# Patient Record
Sex: FEMALE | Race: WHITE | Employment: STUDENT | URBAN - METROPOLITAN AREA
[De-identification: names, ages, dates, MRNs, and addresses within clinical notes are randomized per-mention and may not be internally consistent; named-entity substitution may affect disease eponyms.]

---
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
## 2018-01-05 ENCOUNTER — OFFICE VISIT (OUTPATIENT)
Dept: FAMILY MEDICINE CLINIC | Facility: CLINIC | Age: 18
End: 2018-01-05

## 2018-01-05 VITALS
OXYGEN SATURATION: 99 % | RESPIRATION RATE: 16 BRPM | TEMPERATURE: 99 F | HEIGHT: 64 IN | SYSTOLIC BLOOD PRESSURE: 116 MMHG | HEART RATE: 78 BPM | BODY MASS INDEX: 19.84 KG/M2 | WEIGHT: 116.19 LBS | DIASTOLIC BLOOD PRESSURE: 72 MMHG

## 2018-01-05 DIAGNOSIS — H18.891 CORNEAL IRRITATION OF RIGHT EYE: Primary | ICD-10-CM

## 2018-01-05 PROCEDURE — 99213 OFFICE O/P EST LOW 20 MIN: CPT | Performed by: NURSE PRACTITIONER

## 2018-01-05 RX ORDER — OFLOXACIN 3 MG/ML
2 SOLUTION/ DROPS OPHTHALMIC 4 TIMES DAILY
Qty: 1 BOTTLE | Refills: 0 | Status: SHIPPED | OUTPATIENT
Start: 2018-01-05 | End: 2018-01-12

## 2018-07-24 ENCOUNTER — OFFICE VISIT (OUTPATIENT)
Dept: OBGYN CLINIC | Facility: CLINIC | Age: 18
End: 2018-07-24
Payer: COMMERCIAL

## 2018-07-24 VITALS
HEART RATE: 68 BPM | HEIGHT: 64 IN | SYSTOLIC BLOOD PRESSURE: 104 MMHG | DIASTOLIC BLOOD PRESSURE: 62 MMHG | WEIGHT: 116 LBS | BODY MASS INDEX: 19.81 KG/M2

## 2018-07-24 DIAGNOSIS — Z30.41 ENCOUNTER FOR SURVEILLANCE OF CONTRACEPTIVE PILLS: ICD-10-CM

## 2018-07-24 DIAGNOSIS — Z01.419 WELL WOMAN EXAM WITH ROUTINE GYNECOLOGICAL EXAM: Primary | ICD-10-CM

## 2018-07-24 PROCEDURE — 99395 PREV VISIT EST AGE 18-39: CPT | Performed by: NURSE PRACTITIONER

## 2018-07-24 RX ORDER — LEVONORGESTREL AND ETHINYL ESTRADIOL 0.1-0.02MG
1 KIT ORAL DAILY
Qty: 84 TABLET | Refills: 4 | Status: SHIPPED | OUTPATIENT
Start: 2018-07-24 | End: 2019-08-21

## 2018-07-24 NOTE — PROGRESS NOTES
Here for Routine Annual Exam  Had 2 yeast infections in the Fall/ Winter while away at [employer]. Menses-  Regular, no concern. Contraception- OCP. No C/O, declines STD screen. ROS: No Cardiac, Respiratory, GI,  or Neurological symptoms.     PE:

## 2019-08-21 ENCOUNTER — OFFICE VISIT (OUTPATIENT)
Dept: OBGYN CLINIC | Facility: CLINIC | Age: 19
End: 2019-08-21
Payer: COMMERCIAL

## 2019-08-21 VITALS
SYSTOLIC BLOOD PRESSURE: 108 MMHG | WEIGHT: 126.81 LBS | HEART RATE: 84 BPM | DIASTOLIC BLOOD PRESSURE: 70 MMHG | BODY MASS INDEX: 22 KG/M2

## 2019-08-21 DIAGNOSIS — Z01.419 WELL WOMAN EXAM WITH ROUTINE GYNECOLOGICAL EXAM: Primary | ICD-10-CM

## 2019-08-21 DIAGNOSIS — Z30.41 ENCOUNTER FOR SURVEILLANCE OF CONTRACEPTIVE PILLS: ICD-10-CM

## 2019-08-21 DIAGNOSIS — Z11.3 SCREEN FOR STD (SEXUALLY TRANSMITTED DISEASE): ICD-10-CM

## 2019-08-21 PROCEDURE — 99395 PREV VISIT EST AGE 18-39: CPT | Performed by: NURSE PRACTITIONER

## 2019-08-21 PROCEDURE — 87591 N.GONORRHOEAE DNA AMP PROB: CPT | Performed by: NURSE PRACTITIONER

## 2019-08-21 PROCEDURE — 87491 CHLMYD TRACH DNA AMP PROBE: CPT | Performed by: NURSE PRACTITIONER

## 2019-08-21 RX ORDER — LEVONORGESTREL AND ETHINYL ESTRADIOL 0.1-0.02MG
1 KIT ORAL DAILY
Qty: 84 TABLET | Refills: 4 | Status: SHIPPED | OUTPATIENT
Start: 2019-08-21 | End: 2020-12-15

## 2019-08-21 NOTE — PROGRESS NOTES
Here for Routine Annual Exam  No concerns or questions. Menses are regular, no concern. Contraception- OCP. No C/O    ROS: No Cardiac, Respiratory, GI,  or Neurological symptoms.     PE:  GENERAL: well developed, well nourished, in no apparent dist

## 2019-08-22 LAB
C TRACH DNA SPEC QL NAA+PROBE: NEGATIVE
N GONORRHOEA DNA SPEC QL NAA+PROBE: NEGATIVE

## 2020-11-17 ENCOUNTER — TELEPHONE (OUTPATIENT)
Dept: OBGYN CLINIC | Facility: CLINIC | Age: 20
End: 2020-11-17

## 2020-11-17 NOTE — TELEPHONE ENCOUNTER
Patient returned call. 23year old patient complaining of pain with intercourse. Has sharp lower abd pain with deep penetration and some low back pain. The low back pain has been ongoing- stiff, tightness for roughly a week.  Did improve with stretching reassured.

## 2020-11-17 NOTE — TELEPHONE ENCOUNTER
Patient called, she is away from home because of school, she has an appt with [facility] for her annual on her holiday break on Dec 15th. She wants to speak to a nurse and need advise if she should see a Dr where she is at in the meantime.  She is having pain duri

## 2020-12-15 ENCOUNTER — OFFICE VISIT (OUTPATIENT)
Dept: OBGYN CLINIC | Facility: CLINIC | Age: 20
End: 2020-12-15
Payer: COMMERCIAL

## 2020-12-15 VITALS
BODY MASS INDEX: 21.62 KG/M2 | DIASTOLIC BLOOD PRESSURE: 74 MMHG | SYSTOLIC BLOOD PRESSURE: 108 MMHG | WEIGHT: 126.63 LBS | HEART RATE: 95 BPM | HEIGHT: 64 IN

## 2020-12-15 DIAGNOSIS — Z12.4 CERVICAL CANCER SCREENING: ICD-10-CM

## 2020-12-15 DIAGNOSIS — Z01.419 WELL WOMAN EXAM WITH ROUTINE GYNECOLOGICAL EXAM: Primary | ICD-10-CM

## 2020-12-15 DIAGNOSIS — Z30.41 ENCOUNTER FOR SURVEILLANCE OF CONTRACEPTIVE PILLS: ICD-10-CM

## 2020-12-15 PROCEDURE — 3074F SYST BP LT 130 MM HG: CPT | Performed by: NURSE PRACTITIONER

## 2020-12-15 PROCEDURE — 87625 HPV TYPES 16 & 18 ONLY: CPT | Performed by: NURSE PRACTITIONER

## 2020-12-15 PROCEDURE — 3078F DIAST BP <80 MM HG: CPT | Performed by: NURSE PRACTITIONER

## 2020-12-15 PROCEDURE — 87624 HPV HI-RISK TYP POOLED RSLT: CPT | Performed by: NURSE PRACTITIONER

## 2020-12-15 PROCEDURE — 3008F BODY MASS INDEX DOCD: CPT | Performed by: NURSE PRACTITIONER

## 2020-12-15 PROCEDURE — 99395 PREV VISIT EST AGE 18-39: CPT | Performed by: NURSE PRACTITIONER

## 2020-12-15 RX ORDER — LEVONORGESTREL AND ETHINYL ESTRADIOL 0.1-0.02MG
1 KIT ORAL DAILY
Qty: 84 TABLET | Refills: 4 | Status: SHIPPED | OUTPATIENT
Start: 2020-12-15 | End: 2022-01-28

## 2020-12-15 NOTE — PROGRESS NOTES
Here for Routine Annual Exam  No concerns or questions. Menses are regular, no concerns. Contraception- OCP and doing well. No C/O- she had an episode of low back pain accompanied by painful intercourse x 1 in November. It resolved on its own.     RO

## 2020-12-28 ENCOUNTER — TELEPHONE (OUTPATIENT)
Dept: OBGYN CLINIC | Facility: CLINIC | Age: 20
End: 2020-12-28

## 2020-12-28 NOTE — TELEPHONE ENCOUNTER
Patient has left two mychart messages regarding her test results and is confused has questions .   Thank you

## 2020-12-28 NOTE — TELEPHONE ENCOUNTER
Spoke with patient and her mother (per patient's request) and explained HPV results to both. She verbalized understanding. No further questions or concerns.

## 2021-06-25 NOTE — PROGRESS NOTES
HPI:   Bhavesh Haas is a 23year old female who presents for a complete physical exam for college. Pt needs to get her titers done. Pt prefers to get the blood test done for TB since her work hours do not allow her to get back hereto get a TB test read. TWICE DAILY WITH FOOD AND WATER. NEVER TAKE ON EMPTY STOMACH. • Levonorgestrel-Ethinyl Estrad (AVIANE) 0.1-20 MG-MCG Oral Tab Take 1 tablet by mouth daily. 84 tablet 4   • Probiotic Product (PROBIOTIC-10 OR) Take by mouth.  (Patient not taking: Reported distress  SKIN: no rashes,no suspicious lesions  HEENT: atraumatic, normocephalic,ears and throat are clear  EYES:PERRLA, EOMI, normal optic disk,conjunctiva are clear  NECK: supple,no adenopathy,no bruits  CHEST: no chest tenderness  BREAST: sees gyne  VALERI

## 2021-06-26 ENCOUNTER — LAB ENCOUNTER (OUTPATIENT)
Dept: LAB | Age: 21
End: 2021-06-26
Attending: FAMILY MEDICINE
Payer: COMMERCIAL

## 2021-06-26 ENCOUNTER — OFFICE VISIT (OUTPATIENT)
Dept: INTERNAL MEDICINE CLINIC | Facility: CLINIC | Age: 21
End: 2021-06-26
Payer: COMMERCIAL

## 2021-06-26 VITALS
WEIGHT: 124 LBS | TEMPERATURE: 99 F | SYSTOLIC BLOOD PRESSURE: 92 MMHG | BODY MASS INDEX: 20.91 KG/M2 | HEIGHT: 64.5 IN | OXYGEN SATURATION: 99 % | DIASTOLIC BLOOD PRESSURE: 66 MMHG | RESPIRATION RATE: 12 BRPM | HEART RATE: 79 BPM

## 2021-06-26 DIAGNOSIS — Z01.84 IMMUNITY STATUS TESTING: ICD-10-CM

## 2021-06-26 DIAGNOSIS — Z00.00 ROUTINE GENERAL MEDICAL EXAMINATION AT A HEALTH CARE FACILITY: Primary | ICD-10-CM

## 2021-06-26 DIAGNOSIS — Z11.1 TUBERCULIN SKIN TEST ENCOUNTER: ICD-10-CM

## 2021-06-26 DIAGNOSIS — Z23 NEED FOR TDAP VACCINATION: ICD-10-CM

## 2021-06-26 PROCEDURE — 86762 RUBELLA ANTIBODY: CPT | Performed by: FAMILY MEDICINE

## 2021-06-26 PROCEDURE — 86765 RUBEOLA ANTIBODY: CPT | Performed by: FAMILY MEDICINE

## 2021-06-26 PROCEDURE — 90715 TDAP VACCINE 7 YRS/> IM: CPT | Performed by: FAMILY MEDICINE

## 2021-06-26 PROCEDURE — 86735 MUMPS ANTIBODY: CPT | Performed by: FAMILY MEDICINE

## 2021-06-26 PROCEDURE — 3008F BODY MASS INDEX DOCD: CPT | Performed by: FAMILY MEDICINE

## 2021-06-26 PROCEDURE — 90471 IMMUNIZATION ADMIN: CPT | Performed by: FAMILY MEDICINE

## 2021-06-26 PROCEDURE — 86706 HEP B SURFACE ANTIBODY: CPT | Performed by: FAMILY MEDICINE

## 2021-06-26 PROCEDURE — 99385 PREV VISIT NEW AGE 18-39: CPT | Performed by: FAMILY MEDICINE

## 2021-06-26 PROCEDURE — 86787 VARICELLA-ZOSTER ANTIBODY: CPT | Performed by: FAMILY MEDICINE

## 2021-06-26 PROCEDURE — 3074F SYST BP LT 130 MM HG: CPT | Performed by: FAMILY MEDICINE

## 2021-06-26 PROCEDURE — 3078F DIAST BP <80 MM HG: CPT | Performed by: FAMILY MEDICINE

## 2021-06-26 PROCEDURE — 86480 TB TEST CELL IMMUN MEASURE: CPT | Performed by: FAMILY MEDICINE

## 2021-06-26 RX ORDER — MINOCYCLINE HYDROCHLORIDE 100 MG/1
CAPSULE ORAL
COMMUNITY
Start: 2021-06-07 | End: 2021-12-22 | Stop reason: ALTCHOICE

## 2021-06-30 ENCOUNTER — TELEPHONE (OUTPATIENT)
Dept: INTERNAL MEDICINE CLINIC | Facility: CLINIC | Age: 21
End: 2021-06-30

## 2021-06-30 DIAGNOSIS — Z23 NEED FOR HEPATITIS B VACCINATION: Primary | ICD-10-CM

## 2021-06-30 NOTE — TELEPHONE ENCOUNTER
Patient scheduled for #1 dose hep B series, pended for your review and approval if appropriate. Please sign off, no need to route back.

## 2021-07-02 ENCOUNTER — NURSE ONLY (OUTPATIENT)
Dept: INTERNAL MEDICINE CLINIC | Facility: CLINIC | Age: 21
End: 2021-07-02
Payer: COMMERCIAL

## 2021-07-02 PROCEDURE — 90471 IMMUNIZATION ADMIN: CPT | Performed by: FAMILY MEDICINE

## 2021-07-02 PROCEDURE — 90746 HEPB VACCINE 3 DOSE ADULT IM: CPT | Performed by: FAMILY MEDICINE

## 2021-07-02 NOTE — PROGRESS NOTES
See result note 6/26    Patient started hep B series today. Patient also requested copies of immunizations and labs. Provided to patient.

## 2021-08-16 ENCOUNTER — TELEPHONE (OUTPATIENT)
Dept: INTERNAL MEDICINE CLINIC | Facility: CLINIC | Age: 21
End: 2021-08-16

## 2021-08-16 DIAGNOSIS — Z23 NEED FOR HEPATITIS B VACCINATION: Primary | ICD-10-CM

## 2021-08-16 NOTE — TELEPHONE ENCOUNTER
Patient scheduled for 2nd hep B. Please place orders if appropriate.      Future Appointments   Date Time Provider Brennan Vaughan   8/20/2021  9:00 AM [facility] 08 NURSE [facility] [place]

## 2021-08-20 ENCOUNTER — NURSE ONLY (OUTPATIENT)
Dept: INTERNAL MEDICINE CLINIC | Facility: CLINIC | Age: 21
End: 2021-08-20
Payer: COMMERCIAL

## 2021-08-20 PROCEDURE — 90746 HEPB VACCINE 3 DOSE ADULT IM: CPT | Performed by: FAMILY MEDICINE

## 2021-08-20 PROCEDURE — 90471 IMMUNIZATION ADMIN: CPT | Performed by: FAMILY MEDICINE

## 2021-08-27 ENCOUNTER — LAB ENCOUNTER (OUTPATIENT)
Dept: LAB | Facility: HOSPITAL | Age: 21
End: 2021-08-27
Attending: EMERGENCY MEDICINE
Payer: COMMERCIAL

## 2021-08-27 ENCOUNTER — TELEPHONE (OUTPATIENT)
Dept: INTERNAL MEDICINE CLINIC | Facility: CLINIC | Age: 21
End: 2021-08-27

## 2021-08-27 ENCOUNTER — HOSPITAL ENCOUNTER (OUTPATIENT)
Age: 21
Discharge: HOME | End: 2021-08-27
Attending: EMERGENCY MEDICINE
Payer: COMMERCIAL

## 2021-08-27 VITALS
HEIGHT: 64 IN | TEMPERATURE: 99 F | OXYGEN SATURATION: 99 % | WEIGHT: 125.69 LBS | DIASTOLIC BLOOD PRESSURE: 76 MMHG | BODY MASS INDEX: 21.46 KG/M2 | RESPIRATION RATE: 16 BRPM | SYSTOLIC BLOOD PRESSURE: 111 MMHG | HEART RATE: 87 BPM

## 2021-08-27 DIAGNOSIS — R19.7 BLOODY DIARRHEA: ICD-10-CM

## 2021-08-27 DIAGNOSIS — R19.7 BLOODY DIARRHEA: Primary | ICD-10-CM

## 2021-08-27 LAB
#MXD IC: 0.8 X10ˆ3/UL (ref 0.1–1)
B-HCG UR QL: NEGATIVE
BUN BLD-MCNC: 5 MG/DL (ref 7–18)
CHLORIDE BLD-SCNC: 104 MMOL/L (ref 98–112)
CO2 BLD-SCNC: 25 MMOL/L (ref 21–32)
CREAT BLD-MCNC: 0.7 MG/DL
GLUCOSE BLD-MCNC: 79 MG/DL (ref 70–99)
HCT VFR BLD AUTO: 41.4 %
HCT VFR BLD CALC: 42 %
HGB BLD-MCNC: 13.9 G/DL
ISTAT IONIZED CALCIUM FOR CHEM 8: 1.15 MMOL/L (ref 1.12–1.32)
LYMPHOCYTES # BLD AUTO: 2.2 X10ˆ3/UL (ref 1–4)
LYMPHOCYTES NFR BLD AUTO: 31.1 %
MCH RBC QN AUTO: 32.8 PG (ref 26–34)
MCHC RBC AUTO-ENTMCNC: 33.6 G/DL (ref 31–37)
MCV RBC AUTO: 97.6 FL (ref 80–100)
MIXED CELL %: 10.8 %
NEUTROPHILS # BLD AUTO: 4 X10ˆ3/UL (ref 1.5–7.7)
NEUTROPHILS NFR BLD AUTO: 58.1 %
PLATELET # BLD AUTO: 225 X10ˆ3/UL (ref 150–450)
POTASSIUM BLD-SCNC: 4.6 MMOL/L (ref 3.6–5.1)
RBC # BLD AUTO: 4.24 X10ˆ6/UL
SODIUM BLD-SCNC: 140 MMOL/L (ref 136–145)
WBC # BLD AUTO: 7 X10ˆ3/UL (ref 4–11)

## 2021-08-27 PROCEDURE — 81025 URINE PREGNANCY TEST: CPT

## 2021-08-27 PROCEDURE — 87046 STOOL CULTR AEROBIC BACT EA: CPT

## 2021-08-27 PROCEDURE — 99203 OFFICE O/P NEW LOW 30 MIN: CPT

## 2021-08-27 PROCEDURE — 85025 COMPLETE CBC W/AUTO DIFF WBC: CPT | Performed by: EMERGENCY MEDICINE

## 2021-08-27 PROCEDURE — 87045 FECES CULTURE AEROBIC BACT: CPT

## 2021-08-27 PROCEDURE — 87427 SHIGA-LIKE TOXIN AG IA: CPT

## 2021-08-27 PROCEDURE — 36415 COLL VENOUS BLD VENIPUNCTURE: CPT

## 2021-08-27 PROCEDURE — 87493 C DIFF AMPLIFIED PROBE: CPT

## 2021-08-27 PROCEDURE — 80047 BASIC METABLC PNL IONIZED CA: CPT

## 2021-08-27 PROCEDURE — 99213 OFFICE O/P EST LOW 20 MIN: CPT

## 2021-08-27 RX ORDER — AZITHROMYCIN 250 MG/1
TABLET, FILM COATED ORAL
Qty: 6 TABLET | Refills: 0 | Status: SHIPPED | OUTPATIENT
Start: 2021-08-27 | End: 2021-09-01

## 2021-08-27 NOTE — ED PROVIDER NOTES
Patient Seen in: [facility]      History   Patient presents with:  Diarrhea    Stated Complaint: bloody diarrhea    HPI/Subjective:   HPI    Patient is a 23-year-old female who states that after eating out at restaurants several days ago tenderness, no midline bony tenderness. ABDOMEN: + Bowel sounds, soft, nontender, nondistended. No rebound, no guarding, no hepatosplenomegaly. EXTREMITIES: Full range of motion, no tenderness, good capillary refill. SKIN: No rash, good turgor.   NEURO:

## 2021-08-27 NOTE — TELEPHONE ENCOUNTER
Patient called and stated that she would like to speak with a nurse regarding some stomach issues she's been having for the past 2 days. She believes she has food poising and has had diarrhea for 2 days. She would like to speak with a nurse. Please advise.

## 2021-08-27 NOTE — TELEPHONE ENCOUNTER
Spoke with patient regarding message below. Patient states starting Wednesday night - severe diarrhea with stomach pain. Patient believes symptoms are associated with food that she ate from a restaurant that morning. Patient took immodium that night to assist with symptoms. Patient stated blood was present on tissue and in toilet after using restaurant, small dime sized amount. First noticed yesterday. Patient reports no bowel movement yesterday after taking immodium  Patient has been on Brat diet and has been increasing intake of fluids - water and gatorade. Patient noticed a very small amount of blood again (mucous/blood tinged) noted in bowel movement this morning. Stomach pain has lessened at this time. Passing gas   No abdominal pain/firmness noted per patient. Denies fever, vomiting, chills, body aches, weakness. No other s/s at this time. Patient advised to proceed to Urgent care at this time for further evaluation. Patient agreeable at this time. Patient also advised to schedule follow-up appointment with Dany Mahoney afterward. Patient verbalized understanding.

## 2021-08-27 NOTE — ED INITIAL ASSESSMENT (HPI)
Patient states 2 days ago had sever diarrhea and stomach pain after restaurant meal.  States blood present on tissue and in the toilet- noted yesterday  Patient took Imodium and has been on a PhaseRx.     No abdominal pain, passing gas and stomach crampin

## 2021-08-28 LAB — C DIFF TOX B STL QL: NEGATIVE

## 2021-12-12 ENCOUNTER — HOSPITAL ENCOUNTER (OUTPATIENT)
Age: 21
Discharge: HOME | End: 2021-12-12
Payer: COMMERCIAL

## 2021-12-12 VITALS
TEMPERATURE: 98 F | BODY MASS INDEX: 21.34 KG/M2 | RESPIRATION RATE: 14 BRPM | SYSTOLIC BLOOD PRESSURE: 107 MMHG | WEIGHT: 125 LBS | HEIGHT: 64 IN | HEART RATE: 86 BPM | DIASTOLIC BLOOD PRESSURE: 81 MMHG | OXYGEN SATURATION: 99 %

## 2021-12-12 DIAGNOSIS — Z20.822 CLOSE EXPOSURE TO COVID-19 VIRUS: Primary | ICD-10-CM

## 2021-12-12 PROCEDURE — 99212 OFFICE O/P EST SF 10 MIN: CPT

## 2021-12-12 PROCEDURE — 99211 OFF/OP EST MAY X REQ PHY/QHP: CPT

## 2021-12-12 NOTE — ED PROVIDER NOTES
Patient Seen in: [facility]      History   Patient presents with:  Testing    Stated Complaint: need CV 19 testing    Subjective:   HPI    CHIEF COMPLAINT: Covid testing, post exposure     HISTORY OF PRESENT ILLNESS: Patient is a pleasant vital signs reviewed. All other systems reviewed and negative except as noted above.     Physical Exam     ED Triage Vitals [12/12/21 0815]   /81   Pulse 86   Resp 14   Temp 97.9 °F (36.6 °C)   Temp src Temporal   SpO2 99 %   O2 Device None (Room COVID-19 virus  (primary encounter diagnosis)     Disposition:  Discharge  12/12/2021  8:51 am    Follow-up:  No follow-up provider specified.         Medications Prescribed:  Current Discharge Medication List

## 2021-12-12 NOTE — ED INITIAL ASSESSMENT (HPI)
Patient reports she was exposed to a person on Monday. Patient reports she ate lunch in the same room for about an hour without masks.

## 2021-12-22 ENCOUNTER — TELEPHONE (OUTPATIENT)
Dept: ORTHOPEDICS CLINIC | Facility: CLINIC | Age: 21
End: 2021-12-22

## 2021-12-22 ENCOUNTER — OFFICE VISIT (OUTPATIENT)
Dept: INTERNAL MEDICINE CLINIC | Facility: CLINIC | Age: 21
End: 2021-12-22
Payer: COMMERCIAL

## 2021-12-22 ENCOUNTER — HOSPITAL ENCOUNTER (OUTPATIENT)
Dept: GENERAL RADIOLOGY | Age: 21
Discharge: HOME | End: 2021-12-22
Attending: FAMILY MEDICINE
Payer: COMMERCIAL

## 2021-12-22 VITALS
DIASTOLIC BLOOD PRESSURE: 62 MMHG | SYSTOLIC BLOOD PRESSURE: 98 MMHG | HEIGHT: 64 IN | HEART RATE: 76 BPM | WEIGHT: 123 LBS | BODY MASS INDEX: 21 KG/M2 | RESPIRATION RATE: 14 BRPM | TEMPERATURE: 98 F

## 2021-12-22 DIAGNOSIS — M25.812 CLICKING LEFT SHOULDER: ICD-10-CM

## 2021-12-22 DIAGNOSIS — M25.812 CLICKING LEFT SHOULDER: Primary | ICD-10-CM

## 2021-12-22 PROCEDURE — 3008F BODY MASS INDEX DOCD: CPT | Performed by: FAMILY MEDICINE

## 2021-12-22 PROCEDURE — 3074F SYST BP LT 130 MM HG: CPT | Performed by: FAMILY MEDICINE

## 2021-12-22 PROCEDURE — 73030 X-RAY EXAM OF SHOULDER: CPT | Performed by: FAMILY MEDICINE

## 2021-12-22 PROCEDURE — 99213 OFFICE O/P EST LOW 20 MIN: CPT | Performed by: FAMILY MEDICINE

## 2021-12-22 PROCEDURE — 3078F DIAST BP <80 MM HG: CPT | Performed by: FAMILY MEDICINE

## 2021-12-22 NOTE — TELEPHONE ENCOUNTER
Patient scheduled with Dr. Angle Steel for left shoulder clicking. Please advise if imaging is needed.

## 2021-12-22 NOTE — PROGRESS NOTES
CHIEF COMPLAINT:     Patient presents with: Other: L shoulder has some cracking and pop, has been going on for 2 years. Now more often. Pt denies any pain. HPI:   Nitish Flanagan is a 19year old female .   The patient has complaints of left  shoulder Resp 14   Ht 5' 4\" (1.626 m)   Wt 123 lb (55.8 kg)   LMP 12/22/2021   BMI 21.11 kg/m²   GENERAL: well developed, well nourished,in no apparent distress  SKIN: no rashes,no suspicious lesions  HEAD: atraumatic, normocephalic  EYES: conjunctiva clear  LUNG

## 2021-12-27 ENCOUNTER — OFFICE VISIT (OUTPATIENT)
Dept: ORTHOPEDICS CLINIC | Facility: CLINIC | Age: 21
End: 2021-12-27
Payer: COMMERCIAL

## 2021-12-27 DIAGNOSIS — M25.812 CLICKING LEFT SHOULDER: Primary | ICD-10-CM

## 2021-12-27 PROCEDURE — 99203 OFFICE O/P NEW LOW 30 MIN: CPT | Performed by: ORTHOPAEDIC SURGERY

## 2021-12-27 NOTE — H&P
[facility] - ORTHOPEDICS  [place] [address]  [phone]     NEW PATIENT VISIT - HISTORY AND PHYSICAL EXAMINATION     Name: Brad Isidro   MRN: [id number]  Date: 12/27/2021     CC: Left shoulder clicking Appearance: Normal appearance. HENT:      Head: Normocephalic and atraumatic. Eyes:      Extraocular Movements: Extraocular movements intact. Neck:      Musculoskeletal: Normal range of motion and neck supple.    Cardiovascular:      Pulses: Normal pu personally viewed, independently interpreted and radiology report was reviewed.     XR SHOULDER, COMPLETE (MIN 2 VIEWS), LEFT (CPT=73030)    Result Date: 12/22/2021  PROCEDURE:  XR SHOULDER, COMPLETE (MIN 2 VIEWS), LEFT (CPT=73030)     TECHNIQUE:  Multiple

## 2022-01-17 ENCOUNTER — TELEPHONE (OUTPATIENT)
Dept: INTERNAL MEDICINE CLINIC | Facility: CLINIC | Age: 22
End: 2022-01-17

## 2022-01-17 NOTE — TELEPHONE ENCOUNTER
Pt scheduled 3rd hep b vaccine for school, please place order.      Future Appointments   Date Time Provider Brennan Vaughan   1/21/2022  9:00 AM [facility] 08 NURSE [facility] [place]

## 2022-01-21 ENCOUNTER — NURSE ONLY (OUTPATIENT)
Dept: INTERNAL MEDICINE CLINIC | Facility: CLINIC | Age: 22
End: 2022-01-21
Payer: COMMERCIAL

## 2022-01-21 PROCEDURE — 90471 IMMUNIZATION ADMIN: CPT | Performed by: FAMILY MEDICINE

## 2022-01-21 PROCEDURE — 90746 HEPB VACCINE 3 DOSE ADULT IM: CPT | Performed by: FAMILY MEDICINE

## 2022-01-21 NOTE — PROGRESS NOTES
Hep B #3 given. Patient tolerated well. Vaccine Information Sheet provided to patient. Copy of immunization records given to patient as well.

## 2022-01-28 ENCOUNTER — OFFICE VISIT (OUTPATIENT)
Dept: OBGYN CLINIC | Facility: CLINIC | Age: 22
End: 2022-01-28
Payer: COMMERCIAL

## 2022-01-28 VITALS
SYSTOLIC BLOOD PRESSURE: 116 MMHG | WEIGHT: 124.81 LBS | HEIGHT: 64 IN | DIASTOLIC BLOOD PRESSURE: 70 MMHG | BODY MASS INDEX: 21.31 KG/M2 | HEART RATE: 103 BPM

## 2022-01-28 DIAGNOSIS — Z01.419 WELL WOMAN EXAM WITH ROUTINE GYNECOLOGICAL EXAM: Primary | ICD-10-CM

## 2022-01-28 DIAGNOSIS — Z12.4 CERVICAL CANCER SCREENING: ICD-10-CM

## 2022-01-28 DIAGNOSIS — Z30.41 ENCOUNTER FOR SURVEILLANCE OF CONTRACEPTIVE PILLS: ICD-10-CM

## 2022-01-28 PROCEDURE — 3074F SYST BP LT 130 MM HG: CPT | Performed by: NURSE PRACTITIONER

## 2022-01-28 PROCEDURE — 88175 CYTOPATH C/V AUTO FLUID REDO: CPT | Performed by: NURSE PRACTITIONER

## 2022-01-28 PROCEDURE — 3008F BODY MASS INDEX DOCD: CPT | Performed by: NURSE PRACTITIONER

## 2022-01-28 PROCEDURE — 99395 PREV VISIT EST AGE 18-39: CPT | Performed by: NURSE PRACTITIONER

## 2022-01-28 PROCEDURE — 3078F DIAST BP <80 MM HG: CPT | Performed by: NURSE PRACTITIONER

## 2022-01-28 RX ORDER — LEVONORGESTREL AND ETHINYL ESTRADIOL 0.1-0.02MG
1 KIT ORAL DAILY
Qty: 84 TABLET | Refills: 4 | Status: SHIPPED | OUTPATIENT
Start: 2022-01-28

## 2022-01-28 NOTE — PROGRESS NOTES
Here for Routine Annual Exam  No concerns or questions. Menses are regular. Contraception- OCP and doing well. No C/O- declines STD screen, pap positive for HPV last year. ROS: No Cardiac, Respiratory, GI,  or Neurological symptoms.     PE:  GEN

## 2022-02-14 ENCOUNTER — LAB ENCOUNTER (OUTPATIENT)
Dept: LAB | Age: 22
End: 2022-02-14
Attending: FAMILY MEDICINE
Payer: COMMERCIAL

## 2022-02-14 DIAGNOSIS — Z01.84 IMMUNITY STATUS TESTING: ICD-10-CM

## 2022-02-14 LAB
HBV SURFACE AB SER QL: REACTIVE
HBV SURFACE AB SERPL IA-ACNC: >1000 MIU/ML

## 2022-02-14 PROCEDURE — 86706 HEP B SURFACE ANTIBODY: CPT | Performed by: FAMILY MEDICINE

## 2022-05-17 ENCOUNTER — PATIENT MESSAGE (OUTPATIENT)
Dept: INTERNAL MEDICINE CLINIC | Facility: CLINIC | Age: 22
End: 2022-05-17

## 2022-05-17 DIAGNOSIS — Z11.1 TUBERCULIN SKIN TEST ENCOUNTER: Primary | ICD-10-CM

## 2022-05-17 NOTE — TELEPHONE ENCOUNTER
From: Cintia Adame  To: BETH Valdez  Sent: 5/17/2022 4:47 PM CDT  Subject: TB test request     Michi Bey,     I was wondering if you could place an order for me to receive a TB test, as I received a message from my university that I am 60 days out from needing to update my yearly test result!      Thanks, [employer]

## 2022-06-10 ENCOUNTER — LAB ENCOUNTER (OUTPATIENT)
Dept: LAB | Age: 22
End: 2022-06-10
Attending: FAMILY MEDICINE
Payer: COMMERCIAL

## 2022-06-10 DIAGNOSIS — Z11.1 TUBERCULIN SKIN TEST ENCOUNTER: ICD-10-CM

## 2022-06-10 PROCEDURE — 86480 TB TEST CELL IMMUN MEASURE: CPT

## 2022-06-10 PROCEDURE — 36415 COLL VENOUS BLD VENIPUNCTURE: CPT

## 2022-06-13 LAB
M TB IFN-G CD4+ T-CELLS BLD-ACNC: 0.02 IU/ML
M TB TUBERC IFN-G BLD QL: NEGATIVE
M TB TUBERC IGNF/MITOGEN IGNF CONTROL: 8.94 IU/ML
QFT TB1 AG MINUS NIL: -0.01 IU/ML
QFT TB2 AG MINUS NIL: 0 IU/ML

## 2023-02-28 ENCOUNTER — OFFICE VISIT (OUTPATIENT)
Dept: OBGYN CLINIC | Facility: CLINIC | Age: 23
End: 2023-02-28
Payer: COMMERCIAL

## 2023-02-28 VITALS
SYSTOLIC BLOOD PRESSURE: 106 MMHG | HEART RATE: 71 BPM | BODY MASS INDEX: 22.4 KG/M2 | DIASTOLIC BLOOD PRESSURE: 66 MMHG | HEIGHT: 64 IN | WEIGHT: 131.19 LBS

## 2023-02-28 DIAGNOSIS — Z30.41 ENCOUNTER FOR SURVEILLANCE OF CONTRACEPTIVE PILLS: ICD-10-CM

## 2023-02-28 DIAGNOSIS — N89.8 VAGINAL LEUKORRHEA: ICD-10-CM

## 2023-02-28 DIAGNOSIS — Z01.419 WELL WOMAN EXAM WITH ROUTINE GYNECOLOGICAL EXAM: Primary | ICD-10-CM

## 2023-02-28 DIAGNOSIS — Z12.4 CERVICAL CANCER SCREENING: ICD-10-CM

## 2023-02-28 PROCEDURE — 3078F DIAST BP <80 MM HG: CPT | Performed by: NURSE PRACTITIONER

## 2023-02-28 PROCEDURE — 99395 PREV VISIT EST AGE 18-39: CPT | Performed by: NURSE PRACTITIONER

## 2023-02-28 PROCEDURE — 3008F BODY MASS INDEX DOCD: CPT | Performed by: NURSE PRACTITIONER

## 2023-02-28 PROCEDURE — 3074F SYST BP LT 130 MM HG: CPT | Performed by: NURSE PRACTITIONER

## 2023-02-28 RX ORDER — CLINDAMYCIN PHOSPHATE 20 MG/G
1 CREAM VAGINAL NIGHTLY
Qty: 40 G | Refills: 0 | Status: SHIPPED | OUTPATIENT
Start: 2023-02-28

## 2023-02-28 RX ORDER — LEVONORGESTREL AND ETHINYL ESTRADIOL 0.1-0.02MG
1 KIT ORAL DAILY
Qty: 84 TABLET | Refills: 4 | Status: SHIPPED | OUTPATIENT
Start: 2023-02-28

## 2023-05-06 ENCOUNTER — LAB ENCOUNTER (OUTPATIENT)
Dept: LAB | Age: 23
End: 2023-05-06
Attending: FAMILY MEDICINE
Payer: COMMERCIAL

## 2023-05-06 DIAGNOSIS — Z11.1 SCREENING-PULMONARY TB: ICD-10-CM

## 2023-05-06 PROCEDURE — 86480 TB TEST CELL IMMUN MEASURE: CPT

## 2023-05-06 PROCEDURE — 36415 COLL VENOUS BLD VENIPUNCTURE: CPT

## 2023-05-09 LAB
M TB IFN-G CD4+ T-CELLS BLD-ACNC: 0.02 IU/ML
M TB TUBERC IFN-G BLD QL: NEGATIVE
M TB TUBERC IGNF/MITOGEN IGNF CONTROL: >10 IU/ML
QFT TB1 AG MINUS NIL: -0.01 IU/ML
QFT TB2 AG MINUS NIL: 0.01 IU/ML

## 2023-05-24 ENCOUNTER — OFFICE VISIT (OUTPATIENT)
Dept: FAMILY MEDICINE CLINIC | Facility: CLINIC | Age: 23
End: 2023-05-24
Payer: COMMERCIAL

## 2023-05-24 VITALS
HEART RATE: 81 BPM | WEIGHT: 131 LBS | DIASTOLIC BLOOD PRESSURE: 82 MMHG | SYSTOLIC BLOOD PRESSURE: 117 MMHG | BODY MASS INDEX: 22.36 KG/M2 | TEMPERATURE: 97 F | OXYGEN SATURATION: 97 % | HEIGHT: 64 IN

## 2023-05-24 DIAGNOSIS — N89.8 VAGINAL DISCHARGE: Primary | ICD-10-CM

## 2023-05-24 PROCEDURE — 3074F SYST BP LT 130 MM HG: CPT | Performed by: PHYSICIAN ASSISTANT

## 2023-05-24 PROCEDURE — 87491 CHLMYD TRACH DNA AMP PROBE: CPT | Performed by: PHYSICIAN ASSISTANT

## 2023-05-24 PROCEDURE — 87660 TRICHOMONAS VAGIN DIR PROBE: CPT | Performed by: PHYSICIAN ASSISTANT

## 2023-05-24 PROCEDURE — 3079F DIAST BP 80-89 MM HG: CPT | Performed by: PHYSICIAN ASSISTANT

## 2023-05-24 PROCEDURE — 87591 N.GONORRHOEAE DNA AMP PROB: CPT | Performed by: PHYSICIAN ASSISTANT

## 2023-05-24 PROCEDURE — 87480 CANDIDA DNA DIR PROBE: CPT | Performed by: PHYSICIAN ASSISTANT

## 2023-05-24 PROCEDURE — 3008F BODY MASS INDEX DOCD: CPT | Performed by: PHYSICIAN ASSISTANT

## 2023-05-24 PROCEDURE — 87510 GARDNER VAG DNA DIR PROBE: CPT | Performed by: PHYSICIAN ASSISTANT

## 2023-05-24 PROCEDURE — 99213 OFFICE O/P EST LOW 20 MIN: CPT | Performed by: PHYSICIAN ASSISTANT

## 2023-05-25 DIAGNOSIS — B37.31 VAGINAL CANDIDA: Primary | ICD-10-CM

## 2023-05-25 LAB
C TRACH DNA SPEC QL NAA+PROBE: NEGATIVE
N GONORRHOEA DNA SPEC QL NAA+PROBE: NEGATIVE

## 2023-05-25 RX ORDER — FLUCONAZOLE 150 MG/1
150 TABLET ORAL ONCE
Qty: 1 TABLET | Refills: 1 | Status: SHIPPED | OUTPATIENT
Start: 2023-05-25 | End: 2023-05-25

## 2024-05-14 ENCOUNTER — PATIENT MESSAGE (OUTPATIENT)
Dept: OBGYN CLINIC | Facility: CLINIC | Age: 24
End: 2024-05-14

## 2024-05-14 DIAGNOSIS — Z30.41 ENCOUNTER FOR SURVEILLANCE OF CONTRACEPTIVE PILLS: ICD-10-CM

## 2024-05-15 RX ORDER — LEVONORGESTREL/ETHIN.ESTRADIOL 0.1-0.02MG
1 TABLET ORAL DAILY
Qty: 84 TABLET | Refills: 0 | Status: SHIPPED | OUTPATIENT
Start: 2024-05-15

## 2024-05-15 NOTE — TELEPHONE ENCOUNTER
Last OV: 02/28/2023  Last refill date: 02/28/2023  Follow-up: 1 year   Next appt.: 06/27/2024    Patient requesting refill to cover her until upcoming appt.   3 month refill provided per protocol.

## 2024-05-15 NOTE — TELEPHONE ENCOUNTER
From: Tenisha Pan  To: Neris Nam  Sent: 5/14/2024 6:10 PM CDT  Subject: Birth control refill     Hi Dr. Nam,  I was wondering if you would be able to place a one month refill on my birth control, as I am on my last pack; however, I was unable to get an appointment with you until June 27th. I am on the waitlist to be notified for earlier appointments if possible.

## 2024-05-16 ENCOUNTER — TELEPHONE (OUTPATIENT)
Dept: INTERNAL MEDICINE CLINIC | Facility: CLINIC | Age: 24
End: 2024-05-16

## 2024-05-16 DIAGNOSIS — Z00.00 BLOOD TESTS FOR ROUTINE GENERAL PHYSICAL EXAMINATION: Primary | ICD-10-CM

## 2024-05-16 DIAGNOSIS — Z11.1 SCREENING-PULMONARY TB: ICD-10-CM

## 2024-05-16 NOTE — TELEPHONE ENCOUNTER
Pt requesting for annual labs order and QuantiFERON®-TB Gold Plus order so she can complete prior to upcoming apt.    Future Appointments   Date Time Provider Department Center   5/23/2024  7:30 AM Kaci Apodaca APRN [facility] [place]   6/27/2024 11:15 AM Neris Nam APN [facility] [address]

## 2024-05-21 ENCOUNTER — LAB ENCOUNTER (OUTPATIENT)
Dept: LAB | Age: 24
End: 2024-05-21
Attending: FAMILY MEDICINE

## 2024-05-21 DIAGNOSIS — Z11.1 SCREENING-PULMONARY TB: ICD-10-CM

## 2024-05-21 DIAGNOSIS — Z00.00 BLOOD TESTS FOR ROUTINE GENERAL PHYSICAL EXAMINATION: ICD-10-CM

## 2024-05-21 LAB
ALBUMIN SERPL-MCNC: 4 G/DL (ref 3.4–5)
ALBUMIN/GLOB SERPL: 1 {RATIO} (ref 1–2)
ALP LIVER SERPL-CCNC: 53 U/L
ALT SERPL-CCNC: 26 U/L
ANION GAP SERPL CALC-SCNC: 7 MMOL/L (ref 0–18)
AST SERPL-CCNC: 22 U/L (ref 15–37)
BASOPHILS # BLD AUTO: 0.03 X10(3) UL (ref 0–0.2)
BASOPHILS NFR BLD AUTO: 0.3 %
BILIRUB SERPL-MCNC: 0.4 MG/DL (ref 0.1–2)
BUN BLD-MCNC: 10 MG/DL (ref 9–23)
CALCIUM BLD-MCNC: 9.4 MG/DL (ref 8.5–10.1)
CHLORIDE SERPL-SCNC: 105 MMOL/L (ref 98–112)
CHOLEST SERPL-MCNC: 175 MG/DL (ref ?–200)
CO2 SERPL-SCNC: 23 MMOL/L (ref 21–32)
CREAT BLD-MCNC: 0.86 MG/DL
EGFRCR SERPLBLD CKD-EPI 2021: 96 ML/MIN/1.73M2 (ref 60–?)
EOSINOPHIL # BLD AUTO: 0.13 X10(3) UL (ref 0–0.7)
EOSINOPHIL NFR BLD AUTO: 1.3 %
ERYTHROCYTE [DISTWIDTH] IN BLOOD BY AUTOMATED COUNT: 12.3 %
FASTING PATIENT LIPID ANSWER: YES
FASTING STATUS PATIENT QL REPORTED: YES
GLOBULIN PLAS-MCNC: 4.2 G/DL (ref 2.8–4.4)
GLUCOSE BLD-MCNC: 96 MG/DL (ref 70–99)
HCT VFR BLD AUTO: 42.8 %
HDLC SERPL-MCNC: 54 MG/DL (ref 40–59)
HGB BLD-MCNC: 14.3 G/DL
IMM GRANULOCYTES # BLD AUTO: 0.05 X10(3) UL (ref 0–1)
IMM GRANULOCYTES NFR BLD: 0.5 %
LDLC SERPL CALC-MCNC: 94 MG/DL (ref ?–100)
LYMPHOCYTES # BLD AUTO: 2.58 X10(3) UL (ref 1–4)
LYMPHOCYTES NFR BLD AUTO: 25.8 %
MCH RBC QN AUTO: 32.9 PG (ref 26–34)
MCHC RBC AUTO-ENTMCNC: 33.4 G/DL (ref 31–37)
MCV RBC AUTO: 98.4 FL
MONOCYTES # BLD AUTO: 0.53 X10(3) UL (ref 0.1–1)
MONOCYTES NFR BLD AUTO: 5.3 %
NEUTROPHILS # BLD AUTO: 6.67 X10 (3) UL (ref 1.5–7.7)
NEUTROPHILS # BLD AUTO: 6.67 X10(3) UL (ref 1.5–7.7)
NEUTROPHILS NFR BLD AUTO: 66.8 %
NONHDLC SERPL-MCNC: 121 MG/DL (ref ?–130)
OSMOLALITY SERPL CALC.SUM OF ELEC: 279 MOSM/KG (ref 275–295)
PLATELET # BLD AUTO: 247 10(3)UL (ref 150–450)
POTASSIUM SERPL-SCNC: 4.5 MMOL/L (ref 3.5–5.1)
PROT SERPL-MCNC: 8.2 G/DL (ref 6.4–8.2)
RBC # BLD AUTO: 4.35 X10(6)UL
SODIUM SERPL-SCNC: 135 MMOL/L (ref 136–145)
TRIGL SERPL-MCNC: 156 MG/DL (ref 30–149)
TSI SER-ACNC: 1.83 MIU/ML (ref 0.36–3.74)
VLDLC SERPL CALC-MCNC: 26 MG/DL (ref 0–30)
WBC # BLD AUTO: 10 X10(3) UL (ref 4–11)

## 2024-05-21 PROCEDURE — 86480 TB TEST CELL IMMUN MEASURE: CPT | Performed by: FAMILY MEDICINE

## 2024-05-21 PROCEDURE — 80050 GENERAL HEALTH PANEL: CPT | Performed by: FAMILY MEDICINE

## 2024-05-21 PROCEDURE — 80061 LIPID PANEL: CPT | Performed by: FAMILY MEDICINE

## 2024-05-22 NOTE — PROGRESS NOTES
HPI:   Tenisha Pan is a 23 year old female who presents for a complete physical exam. Patient sees gyne for pap smears and gyne exams. Symptoms: denies discharge, itching, burning or dysuria, periods are regular, flow is 4-5 days.   Regular SBE- occ,Sexually active- yes,  Contraception- OCP. STD history- none    Pt has noticed that she has been having some right lower back,buttock and leg stiffness/soreness. 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 Pt does state when she stretches the stiffness/soreness goes away. Pt will keep an eye on it and if it worsens she will follow up.    Screening:  Immunization History   Administered Date(s) Administered    Covid-19 Vaccine Pfizer 30 mcg/0.3 ml 04/09/2021, 05/07/2021    FLUZONE 6 months and older PFS 0.5 ml (50643) 09/27/2021    HEP B 07/02/2021, 08/20/2021    HEP B, Adult 07/02/2021, 08/20/2021, 01/21/2022    HPV (Gardasil) 06/20/2013, 08/22/2013, 12/26/2013    Meningococcal-Menactra 04/11/2016    TDAP 06/26/2021      Menarche- 11 yr  PAP- 2/28/23  Any abnormal pap smears- none  Pregnancies- 0, Live births- 0  Mammogram-  n/a   Any breast cancer- none, or any gynecological cancer- none, any cancers none  Labs- 5/22/24  DEXA over 65yr- n/a  Flu shot-  fall 2023  Colon- n/a  Glasses/contacts- yes, last exam- 4/2024  Dental visits- 4/2024    Immunization History   Administered Date(s) Administered    Covid-19 Vaccine Pfizer 30 mcg/0.3 ml 04/09/2021, 05/07/2021    FLUZONE 6 months and older PFS 0.5 ml (95865) 09/27/2021    HEP B 07/02/2021, 08/20/2021    HEP B, Adult 07/02/2021, 08/20/2021, 01/21/2022    HPV (Gardasil) 06/20/2013, 08/22/2013, 12/26/2013    Meningococcal-Menactra 04/11/2016    TDAP 06/26/2021     Wt Readings from Last 6 Encounters:   05/23/24 133 lb 9.6 oz (60.6 kg)   05/24/23 131 lb (59.4 kg)   02/28/23 131 lb 3.2 oz (59.5 kg)   01/28/22 124 lb 12.8 oz (56.6 kg)   12/22/21 123 lb (55.8 kg)   12/12/21  125 lb (56.7 kg)     Body mass index is 22.53 kg/m².     Lab Results   Component Value Date    GLU 96 05/21/2024     Lab Results   Component Value Date    CHOLEST 175 05/21/2024     Lab Results   Component Value Date    HDL 54 05/21/2024     Lab Results   Component Value Date    LDL 94 05/21/2024     Lab Results   Component Value Date    AST 22 05/21/2024     Lab Results   Component Value Date    ALT 26 05/21/2024       Current Outpatient Medications   Medication Sig Dispense Refill    Levonorgestrel-Ethinyl Estrad (AVIANE) 0.1-20 MG-MCG Oral Tab Take 1 tablet by mouth daily. 84 tablet 0    Multiple Vitamins-Minerals (ONE A DAY WOMEN 50 PLUS OR) Take by mouth.      clindamycin 2 % Vaginal Cream Place 1 applicator vaginally nightly. (Patient not taking: Reported on 5/24/2023) 40 g 0      History reviewed. No pertinent past medical history.   History reviewed. No pertinent surgical history.   Family History   Problem Relation Age of Onset    Lipids Mother     Diabetes Maternal Grandmother     Lipids Maternal Grandmother       Social History:   Social History     Socioeconomic History    Marital status: Single   Tobacco Use    Smoking status: Never    Smokeless tobacco: Never   Vaping Use    Vaping status: Never Used   Substance and Sexual Activity    Alcohol use: Yes     Comment: weekly    Drug use: Yes     Types: Cannabis    Sexual activity: Yes     Partners: Male     Birth control/protection: Condom, Pill   Other Topics Concern    Caffeine Concern No    Exercise Yes     Comment: 5-7x weeks 30 mins    Seat Belt Yes     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 Children: 0.   Exercise:  5 times per week.  Diet: watches minimally     REVIEW OF SYSTEMS:   GENERAL: feels well otherwise  SKIN: denies any unusual skin lesions  EYES:denies blurred vision or double vision  HEENT: denies nasal congestion, sinus pain or ST  LUNGS: denies shortness of breath with exertion  CARDIOVASCULAR: denies chest pain on exertion  GI: denies abdominal  pain,denies heartburn  : denies dysuria, vaginal discharge or itching,periods regular   MUSCULOSKELETAL: denies back pain, see HPI  NEURO: denies headaches  PSYCHE: denies depression or anxiety  HEMATOLOGIC: denies hx of anemia  ENDOCRINE: denies thyroid history  ALL/ASTHMA: denies hx of allergy or asthma    EXAM:   /62 (BP Location: Left arm, Patient Position: Sitting, Cuff Size: adult)   Pulse 78   Temp 97.8 °F (36.6 °C) (Temporal)   Resp 12   Ht 5' 4.57\" (1.64 m)   Wt 133 lb 9.6 oz (60.6 kg)   LMP 05/09/2024 (Exact Date)   SpO2 96%   BMI 22.53 kg/m²   Body mass index is 22.53 kg/m².   GENERAL: well developed, well nourished,in no apparent distress  SKIN: no rashes,no suspicious lesions  HEENT: atraumatic, normocephalic,ears and throat are clear  EYES:PERRLA, EOMI, normal optic disk,conjunctiva are clear  NECK: supple,no adenopathy,no bruits  CHEST: no chest tenderness  BREAST:sees gyne  LUNGS: clear to auscultation  CARDIO: RRR without murmur  GI: good BS's,no masses, HSM or tenderness  : sees gyne  MUSCULOSKELETAL: back is not tender,FROM of the back  HIP: Right- no joint pain, FROM and gait steady  EXTREMITIES: no cyanosis, clubbing or edema  NEURO: Oriented times three,cranial nerves are intact,motor and sensory are grossly intact    ASSESSMENT AND PLAN:   Tenisha Pan is a 23 year old female who presents for a complete physical exam. Self breast exam encouraged.Labs completed. Results discussed with Pt. Trigs up. Pt will work on eating less saturated fats. Pt' s weight is Body mass index is 22.53 kg/m²., recommended low fat diet and aerobic exercise 30 minutes three times weekly.  The patient indicates understanding of these issues and agrees to the plan.    1. Routine general medical examination at a health care facility  The patient is asked to return for CPX in one year..    2. Right hip pain  - rest, ice/heat and stretches  - if worsens, Pt will follow up. Consider PT.    Encounter  Diagnoses   Name Primary?    Routine general medical examination at a health care facility Yes    Right hip pain        No orders of the defined types were placed in this encounter.      Meds & Refills for this Visit:  Requested Prescriptions      No prescriptions requested or ordered in this encounter       Imaging & Consults:  None

## 2024-05-23 ENCOUNTER — OFFICE VISIT (OUTPATIENT)
Dept: INTERNAL MEDICINE CLINIC | Facility: CLINIC | Age: 24
End: 2024-05-23

## 2024-05-23 VITALS
HEIGHT: 64.57 IN | SYSTOLIC BLOOD PRESSURE: 106 MMHG | DIASTOLIC BLOOD PRESSURE: 62 MMHG | HEART RATE: 78 BPM | BODY MASS INDEX: 22.53 KG/M2 | OXYGEN SATURATION: 96 % | RESPIRATION RATE: 12 BRPM | WEIGHT: 133.63 LBS | TEMPERATURE: 98 F

## 2024-05-23 DIAGNOSIS — M25.551 RIGHT HIP PAIN: ICD-10-CM

## 2024-05-23 DIAGNOSIS — Z00.00 ROUTINE GENERAL MEDICAL EXAMINATION AT A HEALTH CARE FACILITY: Primary | ICD-10-CM

## 2024-05-23 LAB
M TB IFN-G CD4+ T-CELLS BLD-ACNC: 0.07 IU/ML
M TB TUBERC IFN-G BLD QL: NEGATIVE
M TB TUBERC IGNF/MITOGEN IGNF CONTROL: >10 IU/ML
QFT TB1 AG MINUS NIL: 0 IU/ML
QFT TB2 AG MINUS NIL: 0.01 IU/ML

## 2024-05-23 PROCEDURE — 99395 PREV VISIT EST AGE 18-39: CPT | Performed by: FAMILY MEDICINE

## 2024-05-23 PROCEDURE — 3008F BODY MASS INDEX DOCD: CPT | Performed by: FAMILY MEDICINE

## 2024-05-23 PROCEDURE — 3078F DIAST BP <80 MM HG: CPT | Performed by: FAMILY MEDICINE

## 2024-05-23 PROCEDURE — 3074F SYST BP LT 130 MM HG: CPT | Performed by: FAMILY MEDICINE

## 2024-06-20 ENCOUNTER — OFFICE VISIT (OUTPATIENT)
Dept: OBGYN CLINIC | Facility: CLINIC | Age: 24
End: 2024-06-20

## 2024-06-20 VITALS
BODY MASS INDEX: 23.09 KG/M2 | HEIGHT: 64 IN | SYSTOLIC BLOOD PRESSURE: 112 MMHG | WEIGHT: 135.25 LBS | HEART RATE: 78 BPM | DIASTOLIC BLOOD PRESSURE: 70 MMHG

## 2024-06-20 DIAGNOSIS — Z11.3 SCREEN FOR STD (SEXUALLY TRANSMITTED DISEASE): ICD-10-CM

## 2024-06-20 DIAGNOSIS — Z30.41 ENCOUNTER FOR SURVEILLANCE OF CONTRACEPTIVE PILLS: ICD-10-CM

## 2024-06-20 DIAGNOSIS — Z01.419 WELL WOMAN EXAM WITH ROUTINE GYNECOLOGICAL EXAM: Primary | ICD-10-CM

## 2024-06-20 PROCEDURE — 3008F BODY MASS INDEX DOCD: CPT | Performed by: NURSE PRACTITIONER

## 2024-06-20 PROCEDURE — 87491 CHLMYD TRACH DNA AMP PROBE: CPT | Performed by: NURSE PRACTITIONER

## 2024-06-20 PROCEDURE — 87591 N.GONORRHOEAE DNA AMP PROB: CPT | Performed by: NURSE PRACTITIONER

## 2024-06-20 PROCEDURE — 3078F DIAST BP <80 MM HG: CPT | Performed by: NURSE PRACTITIONER

## 2024-06-20 PROCEDURE — 99395 PREV VISIT EST AGE 18-39: CPT | Performed by: NURSE PRACTITIONER

## 2024-06-20 PROCEDURE — 3074F SYST BP LT 130 MM HG: CPT | Performed by: NURSE PRACTITIONER

## 2024-06-20 RX ORDER — LEVONORGESTREL/ETHIN.ESTRADIOL 0.1-0.02MG
1 TABLET ORAL DAILY
Qty: 84 TABLET | Refills: 4 | Status: SHIPPED | OUTPATIENT
Start: 2024-06-20

## 2024-06-20 NOTE — PROGRESS NOTES
Here for Routine Annual Exam  No concerns or questions.  Menses are regular, denies any concerns.  Contraception- OCP and condoms.  OK for STI screen, defer pap    ROS: No Cardiac, Respiratory, GI,  or Neurological symptoms.    PE:  GENERAL: well developed, well nourished, in no apparent distress  SKIN: no rashes, no suspicious lesions  HEENT: normal  NECK: supple; no thyroidmegaly, no adenopathy  LUNGS: clear to auscultation  CARDIOVASCULAR: normal S1, S2, RRR  BREASTS: firm, nontendder, no palpable masses or nodes, no nipple discharge, no skin changes, no axillary adenopathy,    ABDOMEN: Soft, non distended; non tender, no masses  GYNE/: External Genitalia: Normal without lesions or erythema                      Vagina: normal without lesions, scant discharge                      Uterus: mid, mobile, non tender, normal size                     Cervix: no lesions or CMT                     Adnexa: non tender, no masses, normal size  EXTREMITIES:  non tender without edema    A/P:   1. Well woman exam with routine gynecological exam  Regular self breast exams recommended  Pap deferred    2. Encounter for surveillance of contraceptive pills  Discussed risks and danger signs for VTE  - Levonorgestrel-Ethinyl Estrad (AVIANE) 0.1-20 MG-MCG Oral Tab; Take 1 tablet by mouth daily.  Dispense: 84 tablet; Refill: 4    3. Screen for STD (sexually transmitted disease)  - Chlamydia/Gc Amplification; Future     Return to clinic 1 year for routine exam, or as needed with any concerns or question

## 2024-06-21 LAB
C TRACH DNA SPEC QL NAA+PROBE: NEGATIVE
N GONORRHOEA DNA SPEC QL NAA+PROBE: NEGATIVE

## 2024-12-09 ENCOUNTER — OFFICE VISIT (OUTPATIENT)
Dept: INTERNAL MEDICINE CLINIC | Facility: CLINIC | Age: 24
End: 2024-12-09
Payer: COMMERCIAL

## 2024-12-09 VITALS
HEART RATE: 87 BPM | BODY MASS INDEX: 22.88 KG/M2 | TEMPERATURE: 98 F | SYSTOLIC BLOOD PRESSURE: 112 MMHG | HEIGHT: 64 IN | OXYGEN SATURATION: 98 % | WEIGHT: 134 LBS | DIASTOLIC BLOOD PRESSURE: 72 MMHG

## 2024-12-09 DIAGNOSIS — Z23 NEED FOR INFLUENZA VACCINATION: ICD-10-CM

## 2024-12-09 DIAGNOSIS — H60.12 CELLULITIS OF LEFT EARLOBE: Primary | ICD-10-CM

## 2024-12-09 PROCEDURE — 99213 OFFICE O/P EST LOW 20 MIN: CPT | Performed by: FAMILY MEDICINE

## 2024-12-09 PROCEDURE — 3074F SYST BP LT 130 MM HG: CPT | Performed by: FAMILY MEDICINE

## 2024-12-09 PROCEDURE — 3078F DIAST BP <80 MM HG: CPT | Performed by: FAMILY MEDICINE

## 2024-12-09 PROCEDURE — 90656 IIV3 VACC NO PRSV 0.5 ML IM: CPT | Performed by: FAMILY MEDICINE

## 2024-12-09 PROCEDURE — 90471 IMMUNIZATION ADMIN: CPT | Performed by: FAMILY MEDICINE

## 2024-12-09 PROCEDURE — 3008F BODY MASS INDEX DOCD: CPT | Performed by: FAMILY MEDICINE

## 2024-12-09 RX ORDER — CEPHALEXIN 500 MG/1
500 CAPSULE ORAL 2 TIMES DAILY
Qty: 14 CAPSULE | Refills: 0 | Status: SHIPPED | OUTPATIENT
Start: 2024-12-09

## 2024-12-09 NOTE — PROGRESS NOTES
CHIEF COMPLAINT:     Chief Complaint   Patient presents with    Infection     Pt thinks she may have an infection in her ear piercing on left side        HPI:     Tenisha Pan is a 24 year old female who presents with concerns of infected left ear lobe where she has her piercing. Patient first noticed symptoms about 2 weeks ago.  Reports erythema, increased warmth, some tenderness to palpation, and some drainage from area.  Symptoms have been improving some since onset. Pt has gone back to see the person who pierced her ear and they removed it, cleaned it and put in a different earring for her. Pt states it definitely improved after that but is still somewhat redden and wants to make sure it is ok.  No other associated symptoms.  Denies fever, streaking of wound, or other signs of systemic illness.       Current Outpatient Medications   Medication Sig Dispense Refill    cephALEXin (KEFLEX) 500 MG Oral Cap Take 1 capsule (500 mg total) by mouth 2 (two) times daily. 14 capsule 0    Levonorgestrel-Ethinyl Estrad (AVIANE) 0.1-20 MG-MCG Oral Tab Take 1 tablet by mouth daily. 84 tablet 4    Multiple Vitamins-Minerals (ONE A DAY WOMEN 50 PLUS OR) Take by mouth.        History reviewed. No pertinent past medical history.   Social History:  Social History     Socioeconomic History    Marital status: Single   Tobacco Use    Smoking status: Never    Smokeless tobacco: Never   Vaping Use    Vaping status: Never Used   Substance and Sexual Activity    Alcohol use: Yes     Alcohol/week: 5.0 standard drinks of alcohol     Types: 3 Glasses of wine, 2 Cans of beer per week     Comment: socially    Drug use: Not Currently    Sexual activity: Yes     Partners: Male     Birth control/protection: Condom, Pill   Other Topics Concern    Caffeine Concern No    Exercise Yes     Comment: 5-7x weeks 30 mins    Seat Belt Yes        REVIEW OF SYSTEMS:   GENERAL: feels well otherwise, no fever, no chills.  SKIN: as above.  CHEST: no chest  pains, no palpitations.  LUNGS: denies shortness of breath with exertion or rest. No wheezing, no cough.  LYMPH: no enlargement of the lymph nodes.  MUSC/SKEL: no joint swelling, no joint stiffness.  CARDIOVASCULAR: denies chest pain on exertion or rest.  GI: no nausea, no vomiting or abdominal pain  NEURO: no abnormal sensation, no tingling of the skin or numbness.    EXAM:   /72 (BP Location: Left arm, Patient Position: Sitting, Cuff Size: adult)   Pulse 87   Temp 97.5 °F (36.4 °C) (Temporal)   Ht 5' 4\" (1.626 m)   Wt 134 lb (60.8 kg)   LMP 06/05/2024 (Exact Date)   SpO2 98%   BMI 23.00 kg/m²   GENERAL: well developed, well nourished,in no apparent distress  SKIN: Left ear lobe mild erythema and slight swelling. No drg noted but crusty behind ear by the earring.  HEAD: atraumatic, normocephalic  EYES: conjunctiva clear  LUNGS: clear to auscultation bilaterally, no wheezes or rhonchi. Breathing is non labored.  CARDIO: RRR without murmur  LYMPH: no lymphadenopathy.      Physical Exam    ASSESSMENT AND PLAN:     ASSESSMENT:   Encounter Diagnoses   Name Primary?    Need for influenza vaccination     Cellulitis of left earlobe Yes       PLAN: Skin care discussed with patient. Instructions and Comfort Care as listed in Patient Instructions.  Medication as below.    Requested Prescriptions     Signed Prescriptions Disp Refills    cephALEXin (KEFLEX) 500 MG Oral Cap 14 capsule 0     Sig: Take 1 capsule (500 mg total) by mouth 2 (two) times daily.     Flu vaccine given today.  The patient indicates understanding of these issues and agrees to the plan.  The patient is asked to call if sx's persist or worsen.

## 2025-03-03 ENCOUNTER — TELEPHONE (OUTPATIENT)
Dept: OBGYN CLINIC | Facility: CLINIC | Age: 25
End: 2025-03-03

## 2025-03-03 DIAGNOSIS — Z30.41 ENCOUNTER FOR SURVEILLANCE OF CONTRACEPTIVE PILLS: ICD-10-CM

## 2025-03-03 RX ORDER — LEVONORGESTREL/ETHIN.ESTRADIOL 0.1-0.02MG
1 TABLET ORAL DAILY
Qty: 84 TABLET | Refills: 1 | Status: SHIPPED | OUTPATIENT
Start: 2025-03-03

## 2025-03-03 NOTE — TELEPHONE ENCOUNTER
Last annual exam: 6/20/2024  Follow-up recommended: 1 year  Last refill date:   Medication Quantity Refills Start End   Levonorgestrel-Ethinyl Estrad (AVIANE) 0.1-20 MG-MCG Oral Tab 84 tablet 4 6/20/2024 --   Sig:   Take 1 tablet by mouth daily.     Next appt.: Not Due    Preferred pharmacy updated.  Refill sent per protocol.    
numerical 0-10

## 2025-06-23 ENCOUNTER — OFFICE VISIT (OUTPATIENT)
Dept: OBGYN CLINIC | Facility: CLINIC | Age: 25
End: 2025-06-23
Payer: COMMERCIAL

## 2025-06-23 VITALS
DIASTOLIC BLOOD PRESSURE: 64 MMHG | BODY MASS INDEX: 22.88 KG/M2 | WEIGHT: 134 LBS | HEIGHT: 64 IN | SYSTOLIC BLOOD PRESSURE: 102 MMHG

## 2025-06-23 DIAGNOSIS — Z30.41 ENCOUNTER FOR SURVEILLANCE OF CONTRACEPTIVE PILLS: ICD-10-CM

## 2025-06-23 DIAGNOSIS — Z11.3 SCREEN FOR STD (SEXUALLY TRANSMITTED DISEASE): ICD-10-CM

## 2025-06-23 DIAGNOSIS — Z01.419 WELL WOMAN EXAM WITH ROUTINE GYNECOLOGICAL EXAM: Primary | ICD-10-CM

## 2025-06-23 PROCEDURE — 87591 N.GONORRHOEAE DNA AMP PROB: CPT | Performed by: NURSE PRACTITIONER

## 2025-06-23 PROCEDURE — 87491 CHLMYD TRACH DNA AMP PROBE: CPT | Performed by: NURSE PRACTITIONER

## 2025-06-23 RX ORDER — LEVONORGESTREL/ETHIN.ESTRADIOL 0.1-0.02MG
1 TABLET ORAL DAILY
Qty: 84 TABLET | Refills: 4 | Status: SHIPPED | OUTPATIENT
Start: 2025-06-23

## 2025-06-23 NOTE — PROGRESS NOTES
Here for Routine Annual Exam  No concerns or questions.  Menses are regular, she denies any concerns.  Contraception- OCP and doing well.  She would like STD screen    ROS: No Cardiac, Respiratory, GI,  or Neurological symptoms.    PE:  GENERAL: well developed, well nourished, in no apparent distress  SKIN: no rashes, no suspicious lesions  HEENT: normal  NECK: supple; no thyroidmegaly, no adenopathy  LUNGS: clear to auscultation  CARDIOVASCULAR: normal S1, S2, RRR  BREASTS: firm, nontendder, no palpable masses or nodes, no nipple discharge, no skin changes, no axillary adenopathy,    ABDOMEN: Soft, non distended; non tender, no masses  GYNE/: External Genitalia: Normal without lesions or erythema                      Vagina: normal without lesions, small discharge                      Uterus: mid, mobile, non tender, normal size                     Cervix: no lesions or CMT                     Adnexa: non tender, no masses, normal size  EXTREMITIES:  non tender without edema    A/P:  1. Well woman exam with routine gynecological exam  Regular self breast exams recommended    2. Encounter for surveillance of contraceptive pills  Advised on risk and danger signs for VTE  - Levonorgestrel-Ethinyl Estrad (AVIANE) 0.1-20 MG-MCG Oral Tab; Take 1 tablet by mouth daily.  Dispense: 84 tablet; Refill: 4    3. Screen for STD (sexually transmitted disease)  - Chlamydia/Gc Amplification; Future       Return to clinic 1 year for routine exam, or as needed with any concerns or question

## 2025-06-24 LAB
C TRACH DNA SPEC QL NAA+PROBE: NEGATIVE
N GONORRHOEA DNA SPEC QL NAA+PROBE: NEGATIVE

## 2025-07-28 DIAGNOSIS — Z30.41 ENCOUNTER FOR SURVEILLANCE OF CONTRACEPTIVE PILLS: ICD-10-CM

## 2025-07-30 RX ORDER — TIMOLOL MALEATE 5 MG/ML
1 SOLUTION/ DROPS OPHTHALMIC DAILY
Qty: 84 TABLET | Refills: 3 | Status: SHIPPED | OUTPATIENT
Start: 2025-07-30

## (undated) NOTE — MR AVS SNAPSHOT
After Visit Summary   12/15/2020    Krystina Prieto    MRN: [id number]           Visit Information     Date & Time  12/15/2020 10:00 AM Provider  BURAK [facility] [address], [address] [address] [facility] Dept.  Phone  [phone] If you receive a survey from [employer], please take a few minutes to complete it and provide feedback. We strive to deliver the best patient experience and are looking for ways to make improvements. Your feedback will help us do so.  For more information EMERGENCY ROOM Life-threatening emergencies needing immediate intervention at a hospital emergency room.  Average cost  $2,300*   *Cost varies based on your insurance coverage  For more information about hours, locations or appointment options available at

## (undated) NOTE — MR AVS SNAPSHOT
After Visit Summary   1/28/2022    Ian Carrillo   MRN: [id number]           Visit Information     Date & Time  1/28/2022 10:00 AM Provider  BURAK Cervantes [place] [address], [place] [address], [facility] Dept.  Phone  [phone] active on [employer]? Ask us how to get signed up today! If you receive a survey from [employer], please take a few minutes to complete it and provide feedback.  We strive to deliver the best patient experience and are looking for ways to make improvem